# Patient Record
Sex: FEMALE | Race: BLACK OR AFRICAN AMERICAN | Employment: OTHER | URBAN - METROPOLITAN AREA
[De-identification: names, ages, dates, MRNs, and addresses within clinical notes are randomized per-mention and may not be internally consistent; named-entity substitution may affect disease eponyms.]

---

## 2017-02-07 ENCOUNTER — TELEPHONE (OUTPATIENT)
Dept: FAMILY MEDICINE | Facility: CLINIC | Age: 72
End: 2017-02-07

## 2017-02-07 NOTE — Clinical Note
[facility]  [address] [place] [address]  [address]  [facility] [address]  [phone]      February 7, 2017      Lidia Ely  [address]  [facility] [address]          Dear Lidia    In order to ensure we are providing the best quality care, we have reviewed your chart and see that you are due for:    1. Diabetes follow up with A1C check  2. Referral for colonoscopy    Please call the clinic at your earliest convenience to schedule an appointment.  If you have completed these please contact our office via phone at [phone] or [employer] to update our records.  We would like to know the date (approximately month and year), the result, and ideally where the procedure was performed.    Thank you for trusting us with your health care.      Sincerely,    Care Team for Grisel Shipman PA-C

## 2017-02-07 NOTE — TELEPHONE ENCOUNTER
Panel Management Review      Patient has the following on her problem list:     Diabetes    ASA: Passed    Last A1C  A1C      5.8   4/11/2016  A1C tested: Passed    Last LDL:    No results found for this basename: chol  No results found for this basename: hdl  No results found for this basename: ldl  No results found for this basename: trig  No results found for this basename: cholhdlratio  No results found for this basename: nhdl    Is the patient on a Statin? YES             Is the patient on Aspirin? NO    Medications     HMG CoA Reductase Inhibitors    ATORVASTATIN CALCIUM PO          Last three blood pressure readings:  BP Readings from Last 3 Encounters:   05/03/16 139/85   04/26/16 125/75   04/11/16 146/80       Date of last diabetes office visit: 4/11/16     Tobacco History:     History   Smoking status     Former Smoker     Types: Cigarettes     Quit date: 01/01/1997   Smokeless tobacco     Not on file             Composite cancer screening  Chart review shows that this patient is due/due soon for the following Colonoscopy  Summary:    Patient is due/failing the following:   A1C, COLONOSCOPY and PHQ9    Action needed:   Patient needs office visit for diabetes.    Type of outreach:    Sent letter.    Questions for provider review:    None                                                                                                                                    Stephanie Yang MA       Chart routed to none.

## 2017-03-01 ENCOUNTER — TELEPHONE (OUTPATIENT)
Dept: FAMILY MEDICINE | Facility: CLINIC | Age: 72
End: 2017-03-01

## 2017-03-01 NOTE — TELEPHONE ENCOUNTER
Panel Management Review      Patient has the following on her problem list:     Depression / Dysthymia review  PHQ-9 SCORE 3/16/2016 4/11/2016 4/29/2016   Total Score 3 6 3      Patient is due for:  PHQ9    Diabetes    ASA: Passed    Last A1C  Lab Results   Component Value Date    A1C 5.8 04/11/2016     A1C tested: Passed    Last LDL:    No results found for: CHOL  No results found for: HDL  No results found for: LDL  No results found for: TRIG  No results found for: CHOLHDLRATIO  No results found for: NHDL    Is the patient on a Statin? YES             Is the patient on Aspirin? NO    Medications     HMG CoA Reductase Inhibitors    ATORVASTATIN CALCIUM PO          Last three blood pressure readings:  BP Readings from Last 3 Encounters:   05/03/16 139/85   04/26/16 125/75   04/11/16 146/80       Date of last diabetes office visit: 4/11/16     Tobacco History:     History   Smoking Status     Former Smoker     Types: Cigarettes     Quit date: 1/1/1997   Smokeless Tobacco     Not on file           Composite cancer screening  Chart review shows that this patient is due/due soon for the following Colonoscopy  Summary:    Patient is due/failing the following:   A1C, COLONOSCOPY, LDL and PHQ9    Action needed:   Patient needs office visit for diabetes, and a referral for a colonoscopy.    Type of outreach:    Sent letter.    Questions for provider review:    None                                                                                                                                    Stephanie Yang MA       Chart routed to none.

## 2017-03-01 NOTE — LETTER
[facility]  [address] [place] [address]  [address]  [facility] [address]  [phone]      March 1, 2017      Lidia Ely  [address]  [facility] [address]          Dear Lidia,    In order to ensure we are providing the best quality care, we have reviewed your chart and see that you are due for:    1. Colon cancer screening  2. A1C and diabetes check    Please call the clinic at your earliest convenience to schedule an appointment.  If you have completed these please contact our office via phone at [phone] or [employer] to update our records.  We would like to know the date (approximately month and year), the result, and ideally where the procedure was performed.    Thank you for trusting us with your health care.      Sincerely,    Care Team for Grisel Shipman PA-C

## 2017-12-18 DIAGNOSIS — I10 BENIGN ESSENTIAL HYPERTENSION: ICD-10-CM

## 2017-12-19 RX ORDER — DILTIAZEM HYDROCHLORIDE 240 MG/1
CAPSULE, EXTENDED RELEASE ORAL
Qty: 90 CAPSULE | Refills: 0 | OUTPATIENT
Start: 2017-12-19

## 2017-12-19 NOTE — TELEPHONE ENCOUNTER
Requested Prescriptions   Pending Prescriptions Disp Refills     TAZTIA  MG 24 hr ER beaded capsule [Pharmacy Med Name: TAZTIA XT 240MG CAPSULES]  Last Written Prescription Date:  4/29/\16  Last Fill Quantity: 90,  # refills: 0   Last Office Visit with G, P or [facility] prescribing provider:  5/3/16   Future Office Visit:      90 capsule 0     Sig: TAKE 1 CAPSULE(240 MG) BY MOUTH DAILY    Calcium Channel Blockers Protocol  Failed    12/18/2017  8:04 PM       Failed - Blood pressure under 140/90    BP Readings from Last 3 Encounters:   05/03/16 139/85   04/26/16 125/75   04/11/16 146/80                Failed - Normal ALT in past 12 months    Recent Labs   Lab Test  04/11/16   1534   ALT  46            Failed - Recent or future visit with authorizing provider    Patient had office visit in the last year or has a visit in the next 30 days with authorizing provider.  See chart review.              Failed - Normal serum creatinine on file in past 12 months    Recent Labs   Lab Test  04/11/16   1534   CR  0.84            Passed - Patient is age 18 or older       Passed - No active pregnancy on record       Passed - No positive pregnancy test in past 12 months

## 2019-09-12 ENCOUNTER — TRANSFERRED RECORDS (OUTPATIENT)
Dept: HEALTH INFORMATION MANAGEMENT | Facility: CLINIC | Age: 74
End: 2019-09-12

## 2019-09-13 ENCOUNTER — MEDICAL CORRESPONDENCE (OUTPATIENT)
Dept: HEALTH INFORMATION MANAGEMENT | Facility: CLINIC | Age: 74
End: 2019-09-13

## 2019-09-18 ENCOUNTER — HOSPITAL ENCOUNTER (OUTPATIENT)
Dept: WOUND CARE | Facility: CLINIC | Age: 74
Discharge: HOME | End: 2019-09-18
Attending: SURGERY | Admitting: SURGERY
Payer: COMMERCIAL

## 2019-09-18 VITALS
RESPIRATION RATE: 16 BRPM | TEMPERATURE: 97.5 F | DIASTOLIC BLOOD PRESSURE: 69 MMHG | HEART RATE: 74 BPM | SYSTOLIC BLOOD PRESSURE: 130 MMHG

## 2019-09-18 DIAGNOSIS — L98.499 ULCER OF SKIN OF BREAST (H): ICD-10-CM

## 2019-09-18 PROCEDURE — G0463 HOSPITAL OUTPT CLINIC VISIT: HCPCS | Mod: 25

## 2019-09-18 PROCEDURE — 97602 WOUND(S) CARE NON-SELECTIVE: CPT

## 2019-09-18 PROCEDURE — 99203 OFFICE O/P NEW LOW 30 MIN: CPT | Performed by: SURGERY

## 2019-09-18 NOTE — PROGRESS NOTES
[facility] [place] Progress Note    Subject: Lidia Ely consultation for evaluation of chronic wound status post surgical management of left breast cancer, subsequent radiation therapy.  Chronic draining sinus tract left breast, current dressing regime includes saline moistened gauze packing area, adult onset diabetes on metformin, does not utilize tobacco, chronic warfarin use after prior deep venous thromboses and history of paroxysmal atrial fibrillation.  Right-handed and able to perform her own dressing changes to her left breast.  Denies fevers chills or sweats.  PMH:   Past Medical History:   Diagnosis Date     Anxiety      Coronary artery disease     4 stents     Depressive disorder      GERD (gastroesophageal reflux disease)      Hyperlipemia      Hypertension      Paroxysmal atrial fibrillation (H)      Type II diabetes mellitus (H)      Patient Active Problem List   Diagnosis     Lumbar spinal stenosis     DDD (degenerative disc disease), lumbosacral     Chronic coronary artery disease     Osteoarthritis of spine     Major depression in complete remission (H)     Deep vein thrombosis (H)     Abnormal gait     Hepatitis C virus infection     Hypertension goal BP (blood pressure) < 140/90     Hyperlipidemia LDL goal <70     Disorder of intervertebral disc     Morbid obesity due to excess calories (H)     Obstructive sleep apnea syndrome     Paroxysmal atrial fibrillation (H)     Type 2 diabetes mellitus without complication (H)     Osteoarthritis of hip, unspecified laterality, unspecified osteoarthritis type     Long term current use of anticoagulant therapy     Slow transit constipation     Dizziness     Insomnia, unspecified insomnia     Long-term (current) use of anticoagulants [Z79.01]     Acute myocardial infarction (HCC) [I21.3]     Chronic pain syndrome     Ulcer of skin of breast     Social Hx:   Social History     Socioeconomic History     Marital status:      Spouse  name: Not on file     Number of children: Not on file     Years of education: Not on file     Highest education level: Not on file   Occupational History     Not on file   Social Needs     Financial resource strain: Not on file     Food insecurity:     Worry: Not on file     Inability: Not on file     Transportation needs:     Medical: Not on file     Non-medical: Not on file   Tobacco Use     Smoking status: Former Smoker     Types: Cigarettes     Last attempt to quit: 1997     Years since quittin.7   Substance and Sexual Activity     Alcohol use: No     Drug use: No     Sexual activity: Never     Partners: Female   Lifestyle     Physical activity:     Days per week: Not on file     Minutes per session: Not on file     Stress: Not on file   Relationships     Social connections:     Talks on phone: Not on file     Gets together: Not on file     Attends Confucianist service: Not on file     Active member of club or organization: Not on file     Attends meetings of clubs or organizations: Not on file     Relationship status: Not on file     Intimate partner violence:     Fear of current or ex partner: Not on file     Emotionally abused: Not on file     Physically abused: Not on file     Forced sexual activity: Not on file   Other Topics Concern     Parent/sibling w/ CABG, MI or angioplasty before 65F 55M? Not Asked   Social History Narrative     Not on file       Surgical Hx:   Past Surgical History:   Procedure Laterality Date     CARDIAC CATHERIZATION      stent to LAD to RCA     CARDIAC CATHERIZATION  3/2011    DAO to Prox RCA     H ABLATION FOCAL AFIB  2008    PVI     HIP SURGERY         Allergies:    Allergies   Allergen Reactions     Codeine        Medications:   Current Outpatient Medications   Medication     amoxicillin-clavulanate (AUGMENTIN) 875-125 MG tablet     order for DME     ATORVASTATIN CALCIUM PO     diltiazem (TIAZAC) 240 MG 24 hr ER beaded capsule     DULoxetine (CYMBALTA) 30 MG  capsule     LISINOPRIL PO     meclizine 25 MG CHEW     metFORMIN (GLUCOPHAGE) 500 MG tablet     Nitroglycerin (NITROSTAT SL)     OXYCODONE HCL PO     oxyCODONE-acetaminophen (PERCOCET) 5-325 MG per tablet     polyethylene glycol (MIRALAX) powder     ranitidine (ZANTAC) 150 MG tablet     traZODone (DESYREL) 50 MG tablet     warfarin (COUMADIN) 3 MG tablet     WARFARIN SODIUM PO     No current facility-administered medications for this encounter.        Labs:   Recent Labs   Lab Test 05/03/16 04/11/16  1534   ALBUMIN  --   --  3.8   INR 3.0*   < >  --    A1C  --   --  5.8    < > = values in this interval not displayed.     Nutrition requirements were discussed with patient today.  Objective:  /69   Pulse 74   Temp 97.5  F (36.4  C) (Temporal)   Resp 16   Wound (used by OP [facility] only) 09/18/19 1356 Left lateral breast malignant (Active)   Length (cm) 1 9/18/2019  1:00 PM   Width (cm) 0.4 9/18/2019  1:00 PM   Depth (cm) 4.6 9/18/2019  1:00 PM   Wound (cm^2) 0.4 cm^2 9/18/2019  1:00 PM   Wound Volume (cm^3) 1.84 cm^3 9/18/2019  1:00 PM   Dressing Appearance moist drainage 9/18/2019  1:00 PM   Drainage Characteristics/Odor serosanguineous 9/18/2019  1:00 PM   Drainage Amount moderate 9/18/2019  1:00 PM   Thickness/Stage full thickness 9/18/2019  1:00 PM   Base slough 9/18/2019  1:00 PM   Periwound intact 9/18/2019  1:00 PM   Periwound Temperature warm 9/18/2019  1:00 PM   Periwound Skin Turgor soft 9/18/2019  1:00 PM   Edges open 9/18/2019  1:00 PM   Care, Wound non-select wound debridement performed 9/18/2019  1:00 PM        General:  Patient is alert and orientated, no acute distress.  Evaluation of left breast  reveals small wound with proximal 4.6 cm of undermining, no purulence, no foul odor.  Periwound induration, no periwound cellulitis.  No left arm or axillary lymphadenopathy or supraclavicular lymphadenopathy.  Left radial pulse intact.  Left arm has normal range of motion and no axillary scarring.  No  indication for wound debridement.  Wound was probed with a curette though no formal debridement was performed.    Vascular: Intact left radial pulse.      Impression: Chronic left breast wound status post left breast cancer excision and radiation therapy, adult-onset diabetes on metformin  Barriers to healing include: Diabetes  Plan:  We will irrigate wound with Prontosan 4 times a day, Prontosan moistened half inch Nu Gauze to wound 4 times per day.  Do not pack wound, use Nu Gauze as a wick Plurogel to area of radiation dermatitis on the inferior aspect of breast on a daily basis.    Patient will return to the clinic in 4 weeks time.     Jeff Stafford MD on 9/18/2019 at 3:17 PM

## 2019-09-18 NOTE — PROGRESS NOTES
Patient arrived for wound care visit. Certified Wound Care Nurse time spent evaluating patient record, completed a full evaluation and documented wound(s) & prasanna-wound skin; provided recommendation based on treatment plan. Applied dressing, reviewed discharge instructions, patient education, and discussed plan of care with appropriate medical team staff members and patient and/or family members.

## 2019-09-18 NOTE — DISCHARGE INSTRUCTIONS
[facility]  [address] [facility] [address], [place] [address]  Appointment Phone [phone] Nurse Advisors [phone]    Lidia Ely      1945  Please add in Vitamin D3 Vitamin 5,000 international units per day.    Please add in 1 packet of Mayito Supplement TWICE a day until your next appointment     Wound Dressing Change:Left Breast  Cleanse wound and surrounding skin with: Prontosan Wound Irrigation Solution into the wound  Lightly pack wound with prontosan with 1/2 packing strip  Cover wound with gauze or bandaid  Change dressing three times per day.  A diet high in protein is important for wound healing, we recommend getting 90 grams of protein per day. Taking protein shakes or bars are a good way to get extra protein in your diet.     Good sources of protein:  Pork 26g per 3 oz  Whey protein powder - 24g per scoop (on average)  Greek yogurt - 23g per 8oz   Chicken or Turkey - 23g per 3oz  Fish - 20-25g per 3oz  Beef - 18-23g per 3oz  Navy beans - 20g per cup  Cottage cheese - 14g per 1/2 cup   Lentils - 13g per 1/4 cup  Beef jerky 13g per 1oz  2% milk - 8g per cup  Peanut butter - 8g per 2 tablespoons  Eggs - 6g per egg  Mixed nuts - 6g per 2oz         MBrynn Stafford M.D.. September 18, 2019    Call us at [phone] if you have any questions about your wounds, have redness or swelling around your wound, have a fever of 101 or greater or if you have any other problems or concerns. We answer the phone Monday through Friday 8 am to 4 pm, please leave a message as we check the voicemail frequently throughout the day.     Follow up with Provider - 2 weeks

## 2019-10-02 ENCOUNTER — HOSPITAL ENCOUNTER (OUTPATIENT)
Dept: WOUND CARE | Facility: CLINIC | Age: 74
Discharge: HOME | End: 2019-10-02
Attending: SURGERY | Admitting: SURGERY
Payer: COMMERCIAL

## 2019-10-02 VITALS
RESPIRATION RATE: 17 BRPM | DIASTOLIC BLOOD PRESSURE: 70 MMHG | SYSTOLIC BLOOD PRESSURE: 136 MMHG | TEMPERATURE: 97.5 F | HEART RATE: 75 BPM

## 2019-10-02 DIAGNOSIS — L98.499 ULCER OF SKIN OF BREAST (H): ICD-10-CM

## 2019-10-02 PROCEDURE — 97602 WOUND(S) CARE NON-SELECTIVE: CPT

## 2019-10-02 RX ORDER — ASPIRIN 81 MG/1
81 TABLET, CHEWABLE ORAL
COMMUNITY
End: 2019-12-31

## 2019-10-02 RX ORDER — SOTALOL HYDROCHLORIDE 120 MG/1
120 TABLET ORAL
COMMUNITY
Start: 2011-04-04 | End: 2019-12-31

## 2019-10-02 RX ORDER — SENNOSIDES AND DOCUSATE SODIUM 8.6; 5 MG/1; MG/1
TABLET ORAL
COMMUNITY
Start: 2019-07-19 | End: 2019-10-30

## 2019-10-02 RX ORDER — DILTIAZEM HYDROCHLORIDE 240 MG/1
CAPSULE, EXTENDED RELEASE ORAL
Refills: 0 | COMMUNITY
Start: 2018-11-24

## 2019-10-02 RX ORDER — DILTIAZEM HYDROCHLORIDE 240 MG/1
360 CAPSULE, COATED, EXTENDED RELEASE ORAL
COMMUNITY
Start: 2012-07-06

## 2019-10-02 RX ORDER — LISINOPRIL 20 MG/1
TABLET ORAL
Refills: 0 | COMMUNITY
Start: 2018-10-18 | End: 2019-12-31

## 2019-10-02 RX ORDER — DIAZEPAM 5 MG
5 TABLET ORAL
COMMUNITY
Start: 2019-08-18 | End: 2019-10-30

## 2019-10-02 RX ORDER — POLYETHYLENE GLYCOL 3350, SODIUM CHLORIDE, SODIUM BICARBONATE, POTASSIUM CHLORIDE 420; 11.2; 5.72; 1.48 G/4L; G/4L; G/4L; G/4L
POWDER, FOR SOLUTION ORAL
Refills: 0 | COMMUNITY
Start: 2019-04-29 | End: 2019-10-30

## 2019-10-02 RX ORDER — GLECAPREVIR AND PIBRENTASVIR 40; 100 MG/1; MG/1
TABLET, FILM COATED ORAL
COMMUNITY
Start: 2019-02-05

## 2019-10-02 RX ORDER — DULOXETIN HYDROCHLORIDE 20 MG/1
CAPSULE, DELAYED RELEASE ORAL
Refills: 0 | COMMUNITY
Start: 2019-07-10 | End: 2019-10-30

## 2019-10-02 RX ORDER — CLINDAMYCIN HCL 300 MG
CAPSULE ORAL
COMMUNITY
Start: 2019-09-03 | End: 2019-10-30

## 2019-10-02 RX ORDER — HYDRALAZINE HYDROCHLORIDE 25 MG/1
TABLET, FILM COATED ORAL
COMMUNITY

## 2019-10-02 RX ORDER — ACETAMINOPHEN 500 MG
1000 TABLET ORAL
COMMUNITY
Start: 2011-02-17 | End: 2019-10-30

## 2019-10-02 RX ORDER — SULFAMETHOXAZOLE/TRIMETHOPRIM 800-160 MG
TABLET ORAL
COMMUNITY
Start: 2019-09-03 | End: 2019-10-30

## 2019-10-02 RX ORDER — TIZANIDINE 2 MG/1
TABLET ORAL
Refills: 0 | COMMUNITY
Start: 2019-09-11

## 2019-10-02 RX ORDER — VENLAFAXINE HYDROCHLORIDE 75 MG/1
CAPSULE, EXTENDED RELEASE ORAL
COMMUNITY
Start: 2019-04-18 | End: 2019-10-30

## 2019-10-02 RX ORDER — VENLAFAXINE HYDROCHLORIDE 75 MG/1
CAPSULE, EXTENDED RELEASE ORAL
Refills: 3 | COMMUNITY
Start: 2019-09-18

## 2019-10-02 RX ORDER — LETROZOLE 2.5 MG/1
TABLET, FILM COATED ORAL
COMMUNITY
Start: 2019-10-01

## 2019-10-02 NOTE — DISCHARGE INSTRUCTIONS
[facility]  [address] [facility] [address], [place] [address]  Appointment Phone [phone] Nurse Advisors [phone]    Lidia Ely      1945    Please add in Vitamin D3 Vitamin 5,000 international units per day.  Please add in 1 packet of Mayito Supplement TWICE a day until your next appointment  Wound Dressing Change:Left Breast  Cleanse wound and surrounding skin with: saline solution into the wound  Lightly pack wound with plurogel with 1/4 packing strip  Cover wound with gauze or bandaid  Change dressing twice a day  A diet high in protein is important for wound healing, we recommend getting 90 grams  of protein per day. Taking protein shakes or bars are a good way to get extra protein in  your diet.       MARION Stafford M.D.. October 2, 2019    Call us at [phone] if you have any questions about your wounds, have redness or swelling around your wound, have a fever of 101 or greater or if you have any other problems or concerns. We answer the phone Monday through Friday 8 am to 4 pm, please leave a message as we check the voicemail frequently throughout the day.     Follow up with Provider - 4 weeks

## 2019-10-30 ENCOUNTER — HOSPITAL ENCOUNTER (OUTPATIENT)
Dept: WOUND CARE | Facility: CLINIC | Age: 74
Discharge: HOME | End: 2019-10-30
Attending: SURGERY | Admitting: SURGERY
Payer: COMMERCIAL

## 2019-10-30 VITALS
HEART RATE: 59 BPM | RESPIRATION RATE: 16 BRPM | DIASTOLIC BLOOD PRESSURE: 65 MMHG | TEMPERATURE: 97.7 F | SYSTOLIC BLOOD PRESSURE: 114 MMHG

## 2019-10-30 DIAGNOSIS — L98.499 ULCER OF SKIN OF BREAST (H): ICD-10-CM

## 2019-10-30 PROCEDURE — A6248 HYDROGEL DRSG GEL FILLER: HCPCS

## 2019-10-30 PROCEDURE — 97602 WOUND(S) CARE NON-SELECTIVE: CPT

## 2019-10-30 PROCEDURE — 99212 OFFICE O/P EST SF 10 MIN: CPT | Performed by: SURGERY

## 2019-10-30 RX ORDER — MAG HYDROX/ALUMINUM HYD/SIMETH 400-400-40
SUSPENSION, ORAL (FINAL DOSE FORM) ORAL
Refills: 2 | COMMUNITY
Start: 2019-10-12

## 2019-10-30 NOTE — PROGRESS NOTES
[facility] [place] Progress Note    Subject: Lidia Ely returns for evaluation of left breast wound.  Using Plurogel and Prontosan daily.  On vitamin D and Mayito.  No pain.    Patient Active Problem List   Diagnosis     Lumbar spinal stenosis     DDD (degenerative disc disease), lumbosacral     Chronic coronary artery disease     Osteoarthritis of spine     Major depression in complete remission (H)     Deep vein thrombosis (H)     Abnormal gait     Hepatitis C virus infection     Hypertension goal BP (blood pressure) < 140/90     Hyperlipidemia LDL goal <70     Disorder of intervertebral disc     Morbid obesity due to excess calories (H)     Obstructive sleep apnea syndrome     Paroxysmal atrial fibrillation (H)     Type 2 diabetes mellitus without complication (H)     Osteoarthritis of hip, unspecified laterality, unspecified osteoarthritis type     Long term current use of anticoagulant therapy     Slow transit constipation     Dizziness     Insomnia, unspecified insomnia     Long-term (current) use of anticoagulants [Z79.01]     Acute myocardial infarction (HCC) [I21.3]     Chronic pain syndrome     Ulcer of skin of breast     Past Medical History:   Diagnosis Date     Anxiety      Coronary artery disease     4 stents     Depressive disorder      GERD (gastroesophageal reflux disease)      Hyperlipemia      Hypertension      Paroxysmal atrial fibrillation (H)      Type II diabetes mellitus (H)      Exam:  /65   Pulse 59   Temp 97.7  F (36.5  C) (Temporal)   Resp 16   Wound (used by OP [facility] only) 09/18/19 1356 Left lateral breast malignant (Active)   Length (cm) 0.2 10/30/2019 11:00 AM   Width (cm) 0.4 10/30/2019 11:00 AM   Depth (cm) 0.9 10/30/2019 11:00 AM   Wound (cm^2) 0.08 cm^2 10/30/2019 11:00 AM   Wound Volume (cm^3) 0.07 cm^3 10/30/2019 11:00 AM   Wound healing % 80 10/30/2019 11:00 AM   Dressing Appearance moist drainage 10/30/2019 11:00 AM   Drainage Characteristics/Odor  serosanguineous 10/30/2019 11:00 AM   Drainage Amount moderate 10/30/2019 11:00 AM     General appearance is nondistressed, conversant, alert and oriented x3.  Wound per measurements and photodocumentation.  Wound admits a Q-tip head, no cellulitis, no other associated undermining.        Impression: Resolving left breast wound    Plan: We will dress the wounds with Plurogel daily, irrigate with Prontosan daily, cover with a simple Band-Aid...  Mayito and vitamin D when wound closed.  Patient will return to the clinic in 4 weeks time    Jeff Stafford MD on 10/30/2019 at 12:00 PM

## 2019-10-30 NOTE — DISCHARGE INSTRUCTIONS
[facility]  [address] [facility] [address], [facility] [address]  Appointment Phone [phone] Nurse Advisors [phone]    Lidia Ely      1945  Please add in Vitamin D3 Vitamin 5,000 international units per day.    Wound Dressing Change:Left Breast  Cleanse wound with saline or wound cleasner  Cover wound with Woundres gel or Plurogel and bandage or guaze and tape  Change dressing daily     MARION Stafford M.D.. October 30, 2019    Call us at [phone] if you have any questions about your wounds, have redness or swelling around your wound, have a fever of 101 or greater or if you have any other problems or concerns. We answer the phone Monday through Friday 8 am to 4 pm, please leave a message as we check the voicemail frequently throughout the day.     Follow up with Provider - 4 weeks

## 2019-11-27 ENCOUNTER — HOSPITAL ENCOUNTER (OUTPATIENT)
Dept: WOUND CARE | Facility: CLINIC | Age: 74
Discharge: HOME | End: 2019-11-27
Attending: SURGERY | Admitting: SURGERY
Payer: COMMERCIAL

## 2019-11-27 VITALS
DIASTOLIC BLOOD PRESSURE: 62 MMHG | TEMPERATURE: 97.3 F | HEART RATE: 65 BPM | RESPIRATION RATE: 18 BRPM | SYSTOLIC BLOOD PRESSURE: 127 MMHG

## 2019-11-27 DIAGNOSIS — L98.499 ULCER OF SKIN OF BREAST (H): ICD-10-CM

## 2019-11-27 PROCEDURE — 99212 OFFICE O/P EST SF 10 MIN: CPT | Performed by: SURGERY

## 2019-11-27 PROCEDURE — 97602 WOUND(S) CARE NON-SELECTIVE: CPT

## 2019-11-27 NOTE — ADDENDUM NOTE
Encounter addended by: Katarina Bustamante RN on: 11/27/2019 1:51 PM   Actions taken: Order list changed, Diagnosis association updated, Charge Capture section accepted

## 2019-11-27 NOTE — PROGRESS NOTES
[facility] [place] Progress Note    Subject: Lidia Ely left breast wound, daughter who works in vascular center present for today's evaluation.  She denies fevers pains or chills    Patient Active Problem List   Diagnosis     Lumbar spinal stenosis     DDD (degenerative disc disease), lumbosacral     Chronic coronary artery disease     Osteoarthritis of spine     Major depression in complete remission (H)     Deep vein thrombosis (H)     Abnormal gait     Hepatitis C virus infection     Hypertension goal BP (blood pressure) < 140/90     Hyperlipidemia LDL goal <70     Disorder of intervertebral disc     Morbid obesity due to excess calories (H)     Obstructive sleep apnea syndrome     Paroxysmal atrial fibrillation (H)     Type 2 diabetes mellitus without complication (H)     Osteoarthritis of hip, unspecified laterality, unspecified osteoarthritis type     Long term current use of anticoagulant therapy     Slow transit constipation     Dizziness     Insomnia, unspecified insomnia     Long-term (current) use of anticoagulants [Z79.01]     Acute myocardial infarction (HCC) [I21.3]     Chronic pain syndrome     Ulcer of skin of breast     Past Medical History:   Diagnosis Date     Anxiety      Coronary artery disease     4 stents     Depressive disorder      GERD (gastroesophageal reflux disease)      Hyperlipemia      Hypertension      Paroxysmal atrial fibrillation (H)      Type II diabetes mellitus (H)      Exam:  /62   Pulse 65   Temp 97.3  F (36.3  C) (Temporal)   Resp 18      74-year-old female, left breast examined, admits only a metal probe, approximately 20 mm of undermining, no purulence, or foul odor.        Impression: Resolving left breast wound    Plan: We will irrigate left breast sinus tract with 10 cc of Prontosan on a daily basis, we will not place any wicking at this point which would hold the sinus tract open rather than allowing complete closure..  Continue Mayito,  vitamin D supplementation  Patient will return to the clinic in 1 weeks time    Jeff Stafford MD on 11/27/2019 at 11:52 AM

## 2019-11-27 NOTE — DISCHARGE INSTRUCTIONS
[facility]  [address], [facility] [address]  Appointment Phone [phone]     Lidia Solis      1945    Wound Dressing Change:left breast wound  Irrigate wound with about 10ml Prontosan daily  No more dressings required     MARION Stafford M.D.. November 27, 2019    Call us at [phone] if you have any questions about your wounds, have redness or swelling around your wound, have a fever of 101 or greater or if you have any other problems or concerns. We answer the phone Monday through Friday 8 am to 4 pm, please leave a message as we check the voicemail frequently throughout the day.     Follow up with Provider - 1 month

## 2019-12-05 ENCOUNTER — TELEPHONE (OUTPATIENT)
Dept: WOUND CARE | Facility: CLINIC | Age: 74
End: 2019-12-05

## 2019-12-05 NOTE — TELEPHONE ENCOUNTER
Lidia called and said she did not get her full order. I called her back and read her discharge instructions and let her know the process of obtaining the Prontosan. She was not very agreeable to getting it from [facility] because she was not happy that she had to order it from somewhere. I gave her the contact information for [facility] and she said she would call them.

## 2019-12-26 NOTE — PROGRESS NOTES
[facility] [place] Progress Note    Subject: Lidia Ely follow-up for left breast wound, history of malignancy and radiation therapy.    Patient Active Problem List   Diagnosis     Lumbar spinal stenosis     DDD (degenerative disc disease), lumbosacral     Chronic coronary artery disease     Osteoarthritis of spine     Major depression in complete remission (H)     Deep vein thrombosis (H)     Abnormal gait     Hepatitis C virus infection     Hypertension goal BP (blood pressure) < 140/90     Hyperlipidemia LDL goal <70     Disorder of intervertebral disc     Morbid obesity due to excess calories (H)     Obstructive sleep apnea syndrome     Paroxysmal atrial fibrillation (H)     Type 2 diabetes mellitus without complication (H)     Osteoarthritis of hip, unspecified laterality, unspecified osteoarthritis type     Long term current use of anticoagulant therapy     Slow transit constipation     Dizziness     Insomnia, unspecified insomnia     Long-term (current) use of anticoagulants [Z79.01]     Acute myocardial infarction (HCC) [I21.3]     Chronic pain syndrome     Ulcer of skin of breast     Past Medical History:   Diagnosis Date     Anxiety      Coronary artery disease     4 stents     Depressive disorder      GERD (gastroesophageal reflux disease)      Hyperlipemia      Hypertension      Paroxysmal atrial fibrillation (H)      Type II diabetes mellitus (H)      Exam:  /70   Pulse 75   Temp 97.5  F (36.4  C) (Temporal)   Resp 17      74-year-old female, see photo documentation and measurements        Impression: Left breast wound status post malignancy and radiation therapy    Continue current dressing regime, follow-up in 4 weeks, making adequate progress.    Plan:Jeff Stafford MD on 12/26/2019 at 7:27 AM

## 2019-12-31 ENCOUNTER — HOSPITAL ENCOUNTER (OUTPATIENT)
Dept: WOUND CARE | Facility: CLINIC | Age: 74
Discharge: HOME | End: 2019-12-31
Attending: SURGERY | Admitting: SURGERY
Payer: COMMERCIAL

## 2019-12-31 VITALS
RESPIRATION RATE: 18 BRPM | HEART RATE: 74 BPM | SYSTOLIC BLOOD PRESSURE: 143 MMHG | TEMPERATURE: 98.3 F | DIASTOLIC BLOOD PRESSURE: 83 MMHG

## 2019-12-31 DIAGNOSIS — L98.499 ULCER OF SKIN OF BREAST (H): ICD-10-CM

## 2019-12-31 PROCEDURE — G0463 HOSPITAL OUTPT CLINIC VISIT: HCPCS

## 2019-12-31 PROCEDURE — 99212 OFFICE O/P EST SF 10 MIN: CPT | Performed by: SURGERY

## 2019-12-31 RX ORDER — LISINOPRIL 40 MG/1
TABLET ORAL
Refills: 3 | COMMUNITY
Start: 2019-11-19

## 2019-12-31 RX ORDER — SOTALOL HYDROCHLORIDE 160 MG/1
TABLET ORAL
Refills: 0 | COMMUNITY
Start: 2019-11-29

## 2019-12-31 RX ORDER — ASPIRIN 81 MG/1
TABLET, COATED ORAL
COMMUNITY
Start: 2019-12-12

## 2019-12-31 RX ORDER — FAMOTIDINE 40 MG/1
TABLET, FILM COATED ORAL
Refills: 0 | COMMUNITY
Start: 2019-11-25

## 2019-12-31 NOTE — PROGRESS NOTES
Patient arrived for wound care visit. Certified Wound Care Nurse time spent evaluating patient record, completed a full evaluation and documented wound & prasanna-wound skin; provided recommendation based on treatment plan. Applied dressing, reviewed discharge instructions, patient education, and discussed plan of care with appropriate medical team staff members and patient.

## 2019-12-31 NOTE — PROGRESS NOTES
[facility] [place] Progress Note    Subject: Lidia Ely, left breast wound, history of radiation therapy after breast malignancy.  Wound is closed.    Patient Active Problem List   Diagnosis     Lumbar spinal stenosis     DDD (degenerative disc disease), lumbosacral     Chronic coronary artery disease     Osteoarthritis of spine     Major depression in complete remission (H)     Deep vein thrombosis (H)     Abnormal gait     Hepatitis C virus infection     Hypertension goal BP (blood pressure) < 140/90     Hyperlipidemia LDL goal <70     Disorder of intervertebral disc     Morbid obesity due to excess calories (H)     Obstructive sleep apnea syndrome     Paroxysmal atrial fibrillation (H)     Type 2 diabetes mellitus without complication (H)     Osteoarthritis of hip, unspecified laterality, unspecified osteoarthritis type     Long term current use of anticoagulant therapy     Slow transit constipation     Dizziness     Insomnia, unspecified insomnia     Long-term (current) use of anticoagulants [Z79.01]     Acute myocardial infarction (HCC) [I21.3]     Chronic pain syndrome     Ulcer of skin of breast     Past Medical History:   Diagnosis Date     Anxiety      Coronary artery disease     4 stents     Depressive disorder      GERD (gastroesophageal reflux disease)      Hyperlipemia      Hypertension      Paroxysmal atrial fibrillation (H)      Type II diabetes mellitus (H)      Exam:  BP (!) 143/83   Pulse 74   Temp 98.3  F (36.8  C) (Temporal)   Resp 18      74-year-old female, left breast wound closed, no drainage, no erythema, nontender.        Impression: Closed left breast wound, history of breast malignancy, radiation therapy    Plan: We will follow-up as needed.    Jeff tSafford MD on 12/31/2019 at 10:48 AM

## 2019-12-31 NOTE — DISCHARGE INSTRUCTIONS
[facility]  [address], [facility] [address]  Appointment Phone [phone]     Lidia Marinellizier      1945  Your wound is healed!! Dressings are no longer required.        MARION Stafford M.D.. December 31, 2019    Call us at [phone] if you have any questions about your wounds, have redness or swelling around your wound, have a fever of 101 or greater or if you have any other problems or concerns. We answer the phone Monday through Friday 8 am to 4 pm, please leave a message as we check the voicemail frequently throughout the day.     Follow up with Provider - as needed

## 2023-04-20 ENCOUNTER — LAB REQUISITION (OUTPATIENT)
Dept: LAB | Facility: CLINIC | Age: 78
End: 2023-04-20
Payer: COMMERCIAL

## 2023-04-20 DIAGNOSIS — I50.43 ACUTE ON CHRONIC COMBINED SYSTOLIC (CONGESTIVE) AND DIASTOLIC (CONGESTIVE) HEART FAILURE (H): ICD-10-CM

## 2023-04-20 DIAGNOSIS — Z79.01 LONG TERM (CURRENT) USE OF ANTICOAGULANTS: ICD-10-CM

## 2023-04-20 LAB
ANION GAP SERPL CALCULATED.3IONS-SCNC: 14 MMOL/L (ref 7–15)
BUN SERPL-MCNC: 17.3 MG/DL (ref 8–23)
CALCIUM SERPL-MCNC: 9.7 MG/DL (ref 8.8–10.2)
CHLORIDE SERPL-SCNC: 103 MMOL/L (ref 98–107)
CREAT SERPL-MCNC: 1.4 MG/DL (ref 0.51–0.95)
DEPRECATED HCO3 PLAS-SCNC: 20 MMOL/L (ref 22–29)
GFR SERPL CREATININE-BSD FRML MDRD: 39 ML/MIN/1.73M2
GLUCOSE SERPL-MCNC: 128 MG/DL (ref 70–99)
INR PPP: 3.52 (ref 0.85–1.15)
POTASSIUM SERPL-SCNC: 4.6 MMOL/L (ref 3.4–5.3)
SODIUM SERPL-SCNC: 137 MMOL/L (ref 136–145)

## 2023-04-20 PROCEDURE — 80048 BASIC METABOLIC PNL TOTAL CA: CPT | Mod: ORL | Performed by: INTERNAL MEDICINE

## 2023-08-04 ENCOUNTER — LAB REQUISITION (OUTPATIENT)
Dept: LAB | Facility: CLINIC | Age: 78
End: 2023-08-04
Payer: COMMERCIAL

## 2023-08-04 DIAGNOSIS — I50.33 ACUTE ON CHRONIC DIASTOLIC (CONGESTIVE) HEART FAILURE (H): ICD-10-CM

## 2023-08-04 DIAGNOSIS — Z79.01 LONG TERM (CURRENT) USE OF ANTICOAGULANTS: ICD-10-CM

## 2023-08-04 LAB
ANION GAP SERPL CALCULATED.3IONS-SCNC: 13 MMOL/L (ref 7–15)
BUN SERPL-MCNC: 16.2 MG/DL (ref 8–23)
CALCIUM SERPL-MCNC: 9.6 MG/DL (ref 8.8–10.2)
CHLORIDE SERPL-SCNC: 106 MMOL/L (ref 98–107)
CREAT SERPL-MCNC: 1.31 MG/DL (ref 0.51–0.95)
DEPRECATED HCO3 PLAS-SCNC: 25 MMOL/L (ref 22–29)
GFR SERPL CREATININE-BSD FRML MDRD: 42 ML/MIN/1.73M2
GLUCOSE SERPL-MCNC: 111 MG/DL (ref 70–99)
INR PPP: 2.8 (ref 0.85–1.15)
MAGNESIUM SERPL-MCNC: 2.4 MG/DL (ref 1.7–2.3)
NT-PROBNP SERPL-MCNC: 65 PG/ML (ref 0–1800)
POTASSIUM SERPL-SCNC: 4.4 MMOL/L (ref 3.4–5.3)
SODIUM SERPL-SCNC: 144 MMOL/L (ref 136–145)

## 2023-08-04 PROCEDURE — 83735 ASSAY OF MAGNESIUM: CPT | Mod: ORL | Performed by: INTERNAL MEDICINE

## 2023-08-04 PROCEDURE — 80048 BASIC METABOLIC PNL TOTAL CA: CPT | Mod: ORL | Performed by: INTERNAL MEDICINE

## 2023-08-04 PROCEDURE — 83880 ASSAY OF NATRIURETIC PEPTIDE: CPT | Mod: ORL | Performed by: INTERNAL MEDICINE

## 2023-08-04 PROCEDURE — 85610 PROTHROMBIN TIME: CPT | Mod: ORL | Performed by: INTERNAL MEDICINE

## 2023-11-09 ENCOUNTER — HOSPITAL ENCOUNTER (OUTPATIENT)
Facility: CLINIC | Age: 78
Discharge: HOME | End: 2023-11-09
Payer: COMMERCIAL

## 2023-11-09 ENCOUNTER — LAB REQUISITION (OUTPATIENT)
Dept: LAB | Facility: CLINIC | Age: 78
End: 2023-11-09
Payer: COMMERCIAL

## 2023-11-09 DIAGNOSIS — I50.33 ACUTE ON CHRONIC DIASTOLIC (CONGESTIVE) HEART FAILURE (H): ICD-10-CM

## 2023-11-09 DIAGNOSIS — N18.9 CHRONIC KIDNEY DISEASE, UNSPECIFIED: ICD-10-CM

## 2023-11-09 DIAGNOSIS — Z79.01 LONG TERM (CURRENT) USE OF ANTICOAGULANTS: ICD-10-CM

## 2023-11-09 DIAGNOSIS — I50.9 HEART FAILURE, UNSPECIFIED (H): ICD-10-CM

## 2023-11-09 LAB
BUN SERPL-MCNC: 16.5 MG/DL (ref 8–23)
CREAT SERPL-MCNC: 1.18 MG/DL (ref 0.51–0.95)
EGFRCR SERPLBLD CKD-EPI 2021: 48 ML/MIN/1.73M2
HCT VFR BLD AUTO: 33.7 % (ref 35–47)
HGB BLD-MCNC: 11 G/DL (ref 11.7–15.7)
INR PPP: 2.04 (ref 0.85–1.15)
POTASSIUM SERPL-SCNC: 4.4 MMOL/L (ref 3.4–5.3)

## 2023-11-09 PROCEDURE — 82565 ASSAY OF CREATININE: CPT | Mod: ORL | Performed by: NURSE PRACTITIONER

## 2023-11-09 PROCEDURE — 84520 ASSAY OF UREA NITROGEN: CPT | Mod: ORL | Performed by: NURSE PRACTITIONER

## 2023-11-09 PROCEDURE — 84132 ASSAY OF SERUM POTASSIUM: CPT | Mod: ORL | Performed by: NURSE PRACTITIONER

## 2023-11-09 PROCEDURE — 85018 HEMOGLOBIN: CPT | Mod: ORL | Performed by: NURSE PRACTITIONER

## 2023-12-11 ENCOUNTER — LAB REQUISITION (OUTPATIENT)
Dept: LAB | Facility: HOSPITAL | Age: 78
End: 2023-12-11
Payer: COMMERCIAL

## 2023-12-11 DIAGNOSIS — N18.9 CHRONIC KIDNEY DISEASE, UNSPECIFIED: ICD-10-CM

## 2023-12-11 DIAGNOSIS — I50.33 ACUTE ON CHRONIC DIASTOLIC (CONGESTIVE) HEART FAILURE (H): ICD-10-CM

## 2023-12-11 DIAGNOSIS — Z79.01 LONG TERM (CURRENT) USE OF ANTICOAGULANTS: ICD-10-CM

## 2023-12-11 DIAGNOSIS — I50.9 HEART FAILURE, UNSPECIFIED (H): ICD-10-CM

## 2023-12-12 ENCOUNTER — HOSPITAL ENCOUNTER (OUTPATIENT)
Facility: CLINIC | Age: 78
Discharge: HOME | End: 2023-12-12
Payer: COMMERCIAL

## 2023-12-12 ENCOUNTER — LAB REQUISITION (OUTPATIENT)
Dept: LAB | Facility: CLINIC | Age: 78
End: 2023-12-12
Payer: COMMERCIAL

## 2023-12-12 DIAGNOSIS — N18.9 CHRONIC KIDNEY DISEASE, UNSPECIFIED: ICD-10-CM

## 2023-12-12 DIAGNOSIS — I50.33 ACUTE ON CHRONIC DIASTOLIC (CONGESTIVE) HEART FAILURE (H): ICD-10-CM

## 2023-12-12 DIAGNOSIS — I50.9 HEART FAILURE, UNSPECIFIED (H): ICD-10-CM

## 2023-12-12 DIAGNOSIS — Z79.01 LONG TERM (CURRENT) USE OF ANTICOAGULANTS: ICD-10-CM

## 2023-12-12 LAB — INR PPP: 3.74 (ref 0.85–1.15)

## 2023-12-13 LAB
ANION GAP SERPL CALCULATED.3IONS-SCNC: 12 MMOL/L (ref 7–15)
BUN SERPL-MCNC: 23.1 MG/DL (ref 8–23)
CALCIUM SERPL-MCNC: 9.8 MG/DL (ref 8.8–10.2)
CHLORIDE SERPL-SCNC: 105 MMOL/L (ref 98–107)
CREAT SERPL-MCNC: 1.55 MG/DL (ref 0.51–0.95)
DEPRECATED HCO3 PLAS-SCNC: 26 MMOL/L (ref 22–29)
EGFRCR SERPLBLD CKD-EPI 2021: 34 ML/MIN/1.73M2
GLUCOSE SERPL-MCNC: 113 MG/DL (ref 70–99)
POTASSIUM SERPL-SCNC: 4.5 MMOL/L (ref 3.4–5.3)
SODIUM SERPL-SCNC: 143 MMOL/L (ref 135–145)

## 2025-04-07 ENCOUNTER — TRANSFERRED RECORDS (OUTPATIENT)
Dept: HEALTH INFORMATION MANAGEMENT | Facility: CLINIC | Age: 80
End: 2025-04-07
Payer: COMMERCIAL